# Patient Record
Sex: FEMALE | NOT HISPANIC OR LATINO | Employment: FULL TIME | URBAN - METROPOLITAN AREA
[De-identification: names, ages, dates, MRNs, and addresses within clinical notes are randomized per-mention and may not be internally consistent; named-entity substitution may affect disease eponyms.]

---

## 2020-11-11 ENCOUNTER — TRANSFERRED RECORDS (OUTPATIENT)
Dept: HEALTH INFORMATION MANAGEMENT | Facility: CLINIC | Age: 45
End: 2020-11-11

## 2021-11-09 ENCOUNTER — OFFICE VISIT (OUTPATIENT)
Dept: OPHTHALMOLOGY | Facility: CLINIC | Age: 46
End: 2021-11-09
Attending: OPHTHALMOLOGY
Payer: COMMERCIAL

## 2021-11-09 DIAGNOSIS — Z86.69 HISTORY OF RETINAL DETACHMENT: ICD-10-CM

## 2021-11-09 DIAGNOSIS — H43.10 VITREOUS HEMORRHAGE (H): Primary | ICD-10-CM

## 2021-11-09 DIAGNOSIS — H43.392 VITREOUS SYNERESIS, LEFT: Primary | ICD-10-CM

## 2021-11-09 PROCEDURE — G0463 HOSPITAL OUTPT CLINIC VISIT: HCPCS

## 2021-11-09 PROCEDURE — 92134 CPTRZ OPH DX IMG PST SGM RTA: CPT | Performed by: OPHTHALMOLOGY

## 2021-11-09 PROCEDURE — 99203 OFFICE O/P NEW LOW 30 MIN: CPT | Performed by: OPHTHALMOLOGY

## 2021-11-09 PROCEDURE — 92250 FUNDUS PHOTOGRAPHY W/I&R: CPT | Performed by: OPHTHALMOLOGY

## 2021-11-09 PROCEDURE — 99207 FUNDUS PHOTOS OU (BOTH EYES): CPT | Performed by: OPHTHALMOLOGY

## 2021-11-09 ASSESSMENT — CONF VISUAL FIELD
METHOD: COUNTING FINGERS
OS_NORMAL: 1
OD_NORMAL: 1

## 2021-11-09 ASSESSMENT — EXTERNAL EXAM - RIGHT EYE: OD_EXAM: NORMAL

## 2021-11-09 ASSESSMENT — REFRACTION_WEARINGRX
OS_AXIS: 073
OD_AXIS: 117
OS_ADD: +2.00
OD_SPHERE: -1.50
OD_CYLINDER: +0.50
OS_CYLINDER: +0.25
OD_ADD: +2.00
OS_SPHERE: -1.75

## 2021-11-09 ASSESSMENT — TONOMETRY
OD_IOP_MMHG: 14
IOP_METHOD: TONOPEN
OS_IOP_MMHG: 15

## 2021-11-09 ASSESSMENT — SLIT LAMP EXAM - LIDS
COMMENTS: NORMAL
COMMENTS: NORMAL

## 2021-11-09 ASSESSMENT — CUP TO DISC RATIO
OD_RATIO: 0.25
OS_RATIO: 0.25

## 2021-11-09 ASSESSMENT — VISUAL ACUITY
OD_PH_CC: 20/70
OD_CC: 20/125
OS_CC+: -1
OS_CC: 20/20
METHOD: SNELLEN - LINEAR

## 2021-11-09 ASSESSMENT — EXTERNAL EXAM - LEFT EYE: OS_EXAM: NORMAL

## 2021-11-09 NOTE — NURSING NOTE
Chief Complaints and History of Present Illnesses   Patient presents with     Vitreous Hemorrhage Evaluation     Chief Complaint(s) and History of Present Illness(es)     Vitreous Hemorrhage Evaluation     Laterality: right eye    Onset: sudden    Location: central vision    Severity: severe    Course: stable    Associated symptoms: floaters (RE) and flashes.  Negative for eye pain    Pain scale: 0/10              Comments     Janene is here self referred for evaluation of  Vitreous hemorrhage RE. She says June of 2020 she has a hemorrhage then detachment and lost much of her vision RE. She has also had cataract surgery RE as well, but little improvement.     Tom Pina COT 2:27 PM November 9, 2021

## 2021-11-09 NOTE — PROGRESS NOTES
CC -   H/o RD    INTERVAL HISTORY - Initial visit. Here for 2nd opnion after surgery OD for RD & ERM    PMH -   Janene Vines is a  46 year old year-old patient with history of RD OD.  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 by [facility] 6/2020, subsequent RD OD found 9/2020, had PPV/SBP/SF6, subsequent ERM OD removed 1/2021 and CE/IOL OD  VA OD worse than OD      PAST OCULAR SURGERY  9/4/21 PPV/SBP/SF6/EL OD 9/4/20 ([facility])  PPV/MS OD 1/2021 ([facility])  CE/IOL OD 8/16/21 ([facility])    RETINAL IMAGING:  OCT   OD - retina diffusely thick no fluid, outer irregualr central  OS - retina normal, PHF attached      ASSESSMENT & PLAN    # H/o RD OD   - onset 9/2020, s/p PPV/SBP/SF6 9/4/21   - retina flat   - vision limited by outer atrophy OD       # h/o ERM OD   - s/p PPV/MS 1/2021   - diffuse retinal thickening no fluid      # H/o valsalva & macroaneurysm   - no HTn   - s/p large VH/IRH/SRH 6/2020      # Syneresis OS   - advised S/Sx RD 11/2021        # Tr NS OS        return to clinic: 6 months, OCT OU, DFE OU    ATTESTATION     Attending Attestation:     Complete documentation of historical and exam elements from today's encounter can be found in the full encounter summary report (not reduplicated in this progress note).  I personally obtained the chief complaint(s) and history of present illness.  I confirmed and edited as necessary the review of systems, past medical/surgical history, family history, social history, and examination findings as documented by others; and I examined the patient myself.  I personally reviewed the relevant tests, images, and reports as documented above.  I formulated and edited as necessary the assessment and plan and discussed the findings and management plan with the patient and family    Aislinn Bae MD, PhD  , Vitreoretinal Surgery  Department of Ophthalmology  [facility]

## 2022-05-03 DIAGNOSIS — H43.10 VITREOUS HEMORRHAGE (H): Primary | ICD-10-CM

## 2022-05-09 ENCOUNTER — OFFICE VISIT (OUTPATIENT)
Dept: OPHTHALMOLOGY | Facility: CLINIC | Age: 47
End: 2022-05-09
Attending: OPHTHALMOLOGY
Payer: COMMERCIAL

## 2022-05-09 DIAGNOSIS — H35.371 EPIRETINAL MEMBRANE (ERM) OF RIGHT EYE: ICD-10-CM

## 2022-05-09 DIAGNOSIS — H43.392 VITREOUS SYNERESIS OF LEFT EYE: ICD-10-CM

## 2022-05-09 PROCEDURE — 92134 CPTRZ OPH DX IMG PST SGM RTA: CPT | Performed by: OPHTHALMOLOGY

## 2022-05-09 PROCEDURE — 99213 OFFICE O/P EST LOW 20 MIN: CPT | Performed by: OPHTHALMOLOGY

## 2022-05-09 PROCEDURE — G0463 HOSPITAL OUTPT CLINIC VISIT: HCPCS | Mod: 25

## 2022-05-09 RX ORDER — ESTRADIOL 1 MG/1
1 TABLET ORAL DAILY
COMMUNITY
Start: 2021-10-25

## 2022-05-09 RX ORDER — LOSARTAN POTASSIUM 50 MG/1
50 TABLET ORAL DAILY
COMMUNITY
Start: 2022-03-26

## 2022-05-09 RX ORDER — CLONAZEPAM 0.5 MG/1
TABLET ORAL
COMMUNITY
Start: 2022-04-19

## 2022-05-09 RX ORDER — METHYLPHENIDATE HYDROCHLORIDE 20 MG/1
TABLET ORAL
COMMUNITY
Start: 2022-04-21

## 2022-05-09 RX ORDER — TIZANIDINE HYDROCHLORIDE 2 MG/1
2 CAPSULE, GELATIN COATED ORAL
COMMUNITY

## 2022-05-09 RX ORDER — ALBUTEROL SULFATE 90 UG/1
1-2 AEROSOL, METERED RESPIRATORY (INHALATION)
COMMUNITY
Start: 2021-11-16

## 2022-05-09 RX ORDER — AMLODIPINE BESYLATE 5 MG/1
TABLET ORAL
COMMUNITY
Start: 2022-03-26

## 2022-05-09 RX ORDER — FLUTICASONE PROPIONATE 50 MCG
2 SPRAY, SUSPENSION (ML) NASAL
COMMUNITY
Start: 2021-06-28

## 2022-05-09 ASSESSMENT — CONF VISUAL FIELD
METHOD: COUNTING FINGERS
OS_NORMAL: 1
OD_NORMAL: 1

## 2022-05-09 ASSESSMENT — EXTERNAL EXAM - RIGHT EYE: OD_EXAM: NORMAL

## 2022-05-09 ASSESSMENT — VISUAL ACUITY
CORRECTION_TYPE: GLASSES
METHOD: SNELLEN - LINEAR
OD_CC: 20/125
OD_PH_CC: 20/40
OS_CC: 20/20

## 2022-05-09 ASSESSMENT — TONOMETRY
OS_IOP_MMHG: 16
IOP_METHOD: TONOPEN
OD_IOP_MMHG: 14

## 2022-05-09 ASSESSMENT — SLIT LAMP EXAM - LIDS
COMMENTS: NORMAL
COMMENTS: NORMAL

## 2022-05-09 ASSESSMENT — REFRACTION_WEARINGRX
OD_ADD: +2.00
OS_SPHERE: -1.75
OS_ADD: +2.00
OD_CYLINDER: +0.50
OD_SPHERE: -1.50
OS_CYLINDER: +0.25
OD_AXIS: 117
OS_AXIS: 073

## 2022-05-09 ASSESSMENT — EXTERNAL EXAM - LEFT EYE: OS_EXAM: NORMAL

## 2022-05-09 ASSESSMENT — CUP TO DISC RATIO
OS_RATIO: 0.25
OD_RATIO: 0.25

## 2022-05-09 NOTE — NURSING NOTE
Chief Complaints and History of Present Illnesses   Patient presents with     Follow Up     Vitreous syneresis, left      Chief Complaint(s) and History of Present Illness(es)     Follow Up     Pain scale: 0/10    Comments: Vitreous syneresis, left               Comments     Pt C/o increased floaters right eye since last visit  No flashes , eye pain or tearing    Lily Cleaning COT 9:17 AM May 9, 2022

## 2022-05-09 NOTE — PROGRESS NOTES
CC -   H/o RD    INTERVAL HISTORY - No changes, stable floater    PMH -   Janene Vines is a 46 year old patient with history of RD OD.  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 by [facility] 6/2020, subsequent RD OD found 9/2020, had PPV/SBP/SF6, subsequent ERM OD removed 1/2021 and CE/IOL OD        PAST OCULAR SURGERY  PPV/SBP/SF6/EL OD 9/4/20 ([facility])  PPV/MS OD 1/2021 ([facility])  CE/IOL OD 8/16/21 ([facility])    RETINAL IMAGING:  OCT 05/09/22   OD - retina diffusely thick no fluid, outer irregualr central, tr residual eRM  OS - retina normal, PHF attached      ASSESSMENT & PLAN    # H/o RD OD   - onset 9/2020, s/p PPV/SBP/SF6 9/4/21   - retina flat   - vision limited by outer atrophy OD   - focal fibrosis OD inferior - recheck 6-12 motnhs       # h/o ERM OD   - s/p PPV/MS 1/2021   - tr residual NVS   - diffuse retinal thickening no fluid      # H/o valsalva & macroaneurysm   - no HTn   - s/p large VH/IRH/SRH 6/2020      # Syneresis OS   - advised S/Sx RD 5/2022        # Tr NS OS        return to clinic: 6-12  months, OCT OU, DFE OU    ATTESTATION     Attending Attestation:     Complete documentation of historical and exam elements from today's encounter can be found in the full encounter summary report (not reduplicated in this progress note).  I personally obtained the chief complaint(s) and history of present illness.  I confirmed and edited as necessary the review of systems, past medical/surgical history, family history, social history, and examination findings as documented by others; and I examined the patient myself.  I personally reviewed the relevant tests, images, and reports as documented above.  I formulated and edited as necessary the assessment and plan and discussed the findings and management plan with the patient and family    Aislinn Bae MD, PhD  , Vitreoretinal Surgery  Department of Ophthalmology  [facility]

## 2022-11-01 DIAGNOSIS — H35.371 EPIRETINAL MEMBRANE (ERM) OF RIGHT EYE: Primary | ICD-10-CM

## 2022-11-11 ENCOUNTER — OFFICE VISIT (OUTPATIENT)
Dept: OPHTHALMOLOGY | Facility: CLINIC | Age: 47
End: 2022-11-11
Attending: OPHTHALMOLOGY
Payer: COMMERCIAL

## 2022-11-11 DIAGNOSIS — H35.371 EPIRETINAL MEMBRANE (ERM) OF RIGHT EYE: ICD-10-CM

## 2022-11-11 PROCEDURE — 99207 FUNDUS PHOTOS OU (BOTH EYES): CPT | Mod: 26 | Performed by: OPHTHALMOLOGY

## 2022-11-11 PROCEDURE — 99213 OFFICE O/P EST LOW 20 MIN: CPT | Performed by: OPHTHALMOLOGY

## 2022-11-11 PROCEDURE — G0463 HOSPITAL OUTPT CLINIC VISIT: HCPCS | Mod: 25

## 2022-11-11 PROCEDURE — 92134 CPTRZ OPH DX IMG PST SGM RTA: CPT | Performed by: OPHTHALMOLOGY

## 2022-11-11 PROCEDURE — 92250 FUNDUS PHOTOGRAPHY W/I&R: CPT | Performed by: OPHTHALMOLOGY

## 2022-11-11 ASSESSMENT — VISUAL ACUITY
METHOD: SNELLEN - LINEAR
OD_PH_CC: 20/60
OS_CC+: -1
OS_CC: 20/20
OD_CC: 20/150

## 2022-11-11 ASSESSMENT — CONF VISUAL FIELD
OS_INFERIOR_NASAL_RESTRICTION: 0
OS_SUPERIOR_TEMPORAL_RESTRICTION: 0
OD_INFERIOR_TEMPORAL_RESTRICTION: 0
OD_NORMAL: 1
OD_SUPERIOR_NASAL_RESTRICTION: 0
OD_INFERIOR_NASAL_RESTRICTION: 0
OD_SUPERIOR_TEMPORAL_RESTRICTION: 0
OS_SUPERIOR_NASAL_RESTRICTION: 0
OS_NORMAL: 1
OS_INFERIOR_TEMPORAL_RESTRICTION: 0
METHOD: COUNTING FINGERS

## 2022-11-11 ASSESSMENT — REFRACTION_WEARINGRX
OD_CYLINDER: +0.50
OD_ADD: +2.00
OD_SPHERE: -1.50
OS_AXIS: 073
OD_AXIS: 117
OS_ADD: +2.00
OS_CYLINDER: +0.25
OS_SPHERE: -1.75

## 2022-11-11 ASSESSMENT — EXTERNAL EXAM - RIGHT EYE: OD_EXAM: NORMAL

## 2022-11-11 ASSESSMENT — SLIT LAMP EXAM - LIDS
COMMENTS: NORMAL
COMMENTS: NORMAL

## 2022-11-11 ASSESSMENT — EXTERNAL EXAM - LEFT EYE: OS_EXAM: NORMAL

## 2022-11-11 ASSESSMENT — TONOMETRY
OS_IOP_MMHG: 21
IOP_METHOD: ICARE
OD_IOP_MMHG: 17

## 2022-11-11 ASSESSMENT — CUP TO DISC RATIO
OS_RATIO: 0.25
OD_RATIO: 0.25

## 2022-11-11 NOTE — PROGRESS NOTES
CC -   H/o RD    INTERVAL HISTORY - No changes, stable floater OD noticeable past few days similar to prior    PMH -   Janene Vines is a 47 year old patient with history of RD OD.  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 by [facility] 6/2020, subsequent RD OD found 9/2020, had PPV/SBP/SF6, subsequent ERM OD removed 1/2021 and CE/IOL OD        PAST OCULAR SURGERY  PPV/SBP/SF6/EL OD 9/4/20 ([facility])  PPV/MS OD 1/2021 ([facility])  CE/IOL OD 8/16/21 ([facility])    RETINAL IMAGING:  OCT 11/11/22   OD - retina diffusely thick no fluid, outer irregualr central, tr residual eRM  OS - retina normal, PHF attached      ASSESSMENT & PLAN    # H/o RD OD   - onset 9/2020, s/p PPV/SBP/SF6 9/4/21   - retina flat   - vision limited by outer atrophy OD   - focal fibrosis OD inferior - recheck 6-12 motnhs       # h/o ERM OD   - s/p PPV/MS 1/2021   - tr residual NVS   - diffuse retinal thickening no fluid      # H/o valsalva & macroaneurysm   - no HTn   - s/p large VH/IRH/SRH 6/2020      # Syneresis OS   - advised S/Sx RD 11/2022        # Tr NS OS        return to clinic: 6-12  months, OCT OU, DFE OU    ATTESTATION     Attending Attestation:     Complete documentation of historical and exam elements from today's encounter can be found in the full encounter summary report (not reduplicated in this progress note).  I personally obtained the chief complaint(s) and history of present illness.  I confirmed and edited as necessary the review of systems, past medical/surgical history, family history, social history, and examination findings as documented by others; and I examined the patient myself.  I personally reviewed the relevant tests, images, and reports as documented above.  I formulated and edited as necessary the assessment and plan and discussed the findings and management plan with the patient and family    Aislinn Bae MD, PhD  , Vitreoretinal Surgery  Department of Ophthalmology  [facility]

## 2022-11-11 NOTE — NURSING NOTE
Chief Complaints and History of Present Illnesses   Patient presents with     Follow Up     Chief Complaint(s) and History of Present Illness(es)     Follow Up            Laterality: both eyes    Associated symptoms: floaters.  Negative for eye pain and flashes    Treatments tried: no treatments          Comments    48yo female with hx of RD s/p PPV right eye, ERM right hale and syneresis left eye here for follow up. Vision is doing well and stable. She is noticing more floaters. No flashes. No eye pain.    Sharif Nielson COT 10:54 AM November 11, 2022

## 2023-07-11 DIAGNOSIS — H35.371 EPIRETINAL MEMBRANE (ERM) OF RIGHT EYE: Primary | ICD-10-CM
